# Patient Record
Sex: MALE | Race: WHITE | NOT HISPANIC OR LATINO | Employment: FULL TIME | URBAN - METROPOLITAN AREA
[De-identification: names, ages, dates, MRNs, and addresses within clinical notes are randomized per-mention and may not be internally consistent; named-entity substitution may affect disease eponyms.]

---

## 2017-04-14 ENCOUNTER — ALLIED HEALTH/NURSE VISIT (OUTPATIENT)
Dept: NURSING | Facility: CLINIC | Age: 27
End: 2017-04-14
Payer: COMMERCIAL

## 2017-04-14 DIAGNOSIS — H61.21 IMPACTED CERUMEN OF RIGHT EAR: Primary | ICD-10-CM

## 2017-04-14 PROCEDURE — 99207 ZZC NO CHARGE NURSE ONLY: CPT

## 2017-04-14 NOTE — MR AVS SNAPSHOT
After Visit Summary   4/14/2017    Chema Cazares    MRN: [id number]           Patient Information     Date Of Birth          1990        Visit Information        Provider Department      4/14/2017 4:30 PM  NURSE [facility]        Today's Diagnoses     Impacted cerumen of right ear    -  1       Follow-ups after your visit        Who to contact     If you have questions or need follow up information about today's clinic visit or your schedule please contact [facility] directly at [phone].  Normal or non-critical lab and imaging results will be communicated to you by Straker Translationshart, letter or phone within 4 business days after the clinic has received the results. If you do not hear from us within 7 days, please contact the clinic through [employer] or phone. If you have a critical or abnormal lab result, we will notify you by phone as soon as possible.  Submit refill requests through PolySuite or call your pharmacy and they will forward the refill request to us. Please allow 3 business days for your refill to be completed.          Additional Information About Your Visit        MyChart Information     PolySuite gives you secure access to your electronic health record. If you see a primary care provider, you can also send messages to your care team and make appointments. If you have questions, please call your primary care clinic.  If you do not have a primary care provider, please call [phone] and they will assist you.        Care EveryWhere ID     This is your Care EveryWhere ID. This could be used by other organizations to access your [facility] medical records  [id number]         Blood Pressure from Last 3 Encounters:   11/15/12 120/69    Weight from Last 3 Encounters:   11/15/12 153 lb (69.4 kg)              Today, you had the following     No orders found for display       Primary Care Provider    Physician No Ref-Primary       No address on file        Thank you!      Thank you for choosing [facility]  for your care. Our goal is always to provide you with excellent care. Hearing back from our patients is one way we can continue to improve our services. Please take a few minutes to complete the written survey that you may receive in the mail after your visit with us. Thank you!             Your Updated Medication List - Protect others around you: Learn how to safely use, store and throw away your medicines at www.disposemymeds.org.          This list is accurate as of: 4/14/17  4:51 PM.  Always use your most recent med list.                   Brand Name Dispense Instructions for use    naproxen 500 MG tablet    NAPROSYN    60 tablet    Take 1 tablet by mouth 2 times daily as needed.       NO ACTIVE MEDICATIONS

## 2017-04-14 NOTE — NURSING NOTE
Chema Cazarse is here for an ear wash.    Tried to remove wax doing an ear lavage and the wax was too hard. Patient started complaining there was a lot of pressure, so I stopped. He said he would try some things at home and if he is unable to he will return.

## 2017-08-14 ENCOUNTER — OFFICE VISIT (OUTPATIENT)
Dept: FAMILY MEDICINE | Facility: CLINIC | Age: 27
End: 2017-08-14
Payer: COMMERCIAL

## 2017-08-14 VITALS
BODY MASS INDEX: 20.77 KG/M2 | OXYGEN SATURATION: 98 % | WEIGHT: 145.1 LBS | TEMPERATURE: 98.4 F | DIASTOLIC BLOOD PRESSURE: 87 MMHG | RESPIRATION RATE: 16 BRPM | HEIGHT: 70 IN | SYSTOLIC BLOOD PRESSURE: 135 MMHG | HEART RATE: 94 BPM

## 2017-08-14 DIAGNOSIS — A63.0 CONDYLOMATA ACUMINATA: ICD-10-CM

## 2017-08-14 DIAGNOSIS — Z11.3 SCREEN FOR STD (SEXUALLY TRANSMITTED DISEASE): Primary | ICD-10-CM

## 2017-08-14 PROCEDURE — 87389 HIV-1 AG W/HIV-1&-2 AB AG IA: CPT | Performed by: FAMILY MEDICINE

## 2017-08-14 PROCEDURE — 99212 OFFICE O/P EST SF 10 MIN: CPT | Mod: 25 | Performed by: FAMILY MEDICINE

## 2017-08-14 PROCEDURE — 86780 TREPONEMA PALLIDUM: CPT | Performed by: FAMILY MEDICINE

## 2017-08-14 PROCEDURE — 54056 CRYOSURGERY PENIS LESION(S): CPT | Performed by: FAMILY MEDICINE

## 2017-08-14 PROCEDURE — 87591 N.GONORRHOEAE DNA AMP PROB: CPT | Performed by: FAMILY MEDICINE

## 2017-08-14 PROCEDURE — 87491 CHLMYD TRACH DNA AMP PROBE: CPT | Performed by: FAMILY MEDICINE

## 2017-08-14 PROCEDURE — 36415 COLL VENOUS BLD VENIPUNCTURE: CPT | Performed by: FAMILY MEDICINE

## 2017-08-14 NOTE — MR AVS SNAPSHOT
"              After Visit Summary   8/14/2017    Chema Cazares    MRN: [id number]           Patient Information     Date Of Birth          1990        Visit Information        Provider Department      8/14/2017 5:30 PM Humble Rhoades MD [facility]        Today's Diagnoses     Screen for STD (sexually transmitted disease)    -  1    Condylomata acuminata           Follow-ups after your visit        Who to contact     If you have questions or need follow up information about today's clinic visit or your schedule please contact [facility] directly at [phone].  Normal or non-critical lab and imaging results will be communicated to you by Taodynehart, letter or phone within 4 business days after the clinic has received the results. If you do not hear from us within 7 days, please contact the clinic through Taodynehart or phone. If you have a critical or abnormal lab result, we will notify you by phone as soon as possible.  Submit refill requests through RealtimeBoard or call your pharmacy and they will forward the refill request to us. Please allow 3 business days for your refill to be completed.          Additional Information About Your Visit        MyChart Information     RealtimeBoard gives you secure access to your electronic health record. If you see a primary care provider, you can also send messages to your care team and make appointments. If you have questions, please call your primary care clinic.  If you do not have a primary care provider, please call [phone] and they will assist you.        Care EveryWhere ID     This is your Care EveryWhere ID. This could be used by other organizations to access your [place] medical records  [id number]        Your Vitals Were     Pulse Temperature Respirations Height Pulse Oximetry BMI (Body Mass Index)    94 98.4  F (36.9  C) (Oral) 16 5' 10\" (1.778 m) 98% 20.82 kg/m2       Blood Pressure from Last 3 Encounters:   08/14/17 " 135/87   11/15/12 120/69    Weight from Last 3 Encounters:   08/14/17 145 lb 1.6 oz (65.8 kg)   11/15/12 153 lb (69.4 kg)              We Performed the Following     Anti Treponema     CHLAMYDIA TRACHOMATIS PCR     DESTRUCT BENIGN LESION, UP TO 14     HIV Antigen Antibody Combo     NEISSERIA GONORRHOEA PCR        Primary Care Provider    Physician No Ref-Primary       No address on file        Equal Access to Services     KIKO SALCIDO : Hadii aad ku hadasho Soomaali, waaxda luqadaha, qaybta kaalmada adeegyada, waxay idiin hayaan aderadha english laBrandiflorence . So [facility] [phone].    ATENCIÓN: Si habla breanna, tiene a bone disposición servicios gratuitos de asistencia lingüística. Llame al [phone].    We comply with applicable federal civil rights laws and [place] laws. We do not discriminate on the basis of race, color, national origin, age, disability sex, sexual orientation or gender identity.            Thank you!     Thank you for choosing [facility]  for your care. Our goal is always to provide you with excellent care. Hearing back from our patients is one way we can continue to improve our services. Please take a few minutes to complete the written survey that you may receive in the mail after your visit with us. Thank you!             Your Updated Medication List - Protect others around you: Learn how to safely use, store and throw away your medicines at www.disposemymeds.org.          This list is accurate as of: 8/14/17 11:59 PM.  Always use your most recent med list.                   Brand Name Dispense Instructions for use Diagnosis    naproxen 500 MG tablet    NAPROSYN    60 tablet    Take 1 tablet by mouth 2 times daily as needed.    Pain in shoulder, Elbow dislocation       NO ACTIVE MEDICATIONS

## 2017-08-15 PROBLEM — A63.0 CONDYLOMATA ACUMINATA: Status: ACTIVE | Noted: 2017-08-15

## 2017-08-15 LAB — HIV 1+2 AB+HIV1 P24 AG SERPL QL IA: NONREACTIVE

## 2017-08-16 LAB
C TRACH DNA SPEC QL NAA+PROBE: NEGATIVE
N GONORRHOEA DNA SPEC QL NAA+PROBE: NEGATIVE
SPECIMEN SOURCE: NORMAL
SPECIMEN SOURCE: NORMAL
T PALLIDUM IGG+IGM SER QL: NEGATIVE

## 2017-10-13 ENCOUNTER — TELEPHONE (OUTPATIENT)
Dept: FAMILY MEDICINE | Facility: CLINIC | Age: 27
End: 2017-10-13

## 2017-10-13 NOTE — TELEPHONE ENCOUNTER
"FYI:    Patient is reporting chest soreness, over the left side of his chest.  Some left shoulder pain too, \"messed up\" shoulder and arm in the past per his report.  Some numbness in elbow, this is more frequent lately. Has also dislocated that elbow many times.  No nausea, dizziness, numbness in his fingers. No sweating episodes. No palpitations.  Pain does not radiate to him arm, jaw or back. No recent illnesses. No fever.  Pain is not worse with pressure on the chest.  Feeling fine otherwise. 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  This will be going on for a while. Has an appointment next Tuesday with Leonid to check this.  He is advised to be seen sooner if the symptoms are increased and pain does not resolve.    Dolores King, JUANY  Triage Nurse  "

## 2017-10-17 ENCOUNTER — OFFICE VISIT (OUTPATIENT)
Dept: FAMILY MEDICINE | Facility: CLINIC | Age: 27
End: 2017-10-17
Payer: COMMERCIAL

## 2017-10-17 VITALS
DIASTOLIC BLOOD PRESSURE: 60 MMHG | BODY MASS INDEX: 22.09 KG/M2 | SYSTOLIC BLOOD PRESSURE: 122 MMHG | HEIGHT: 70 IN | OXYGEN SATURATION: 100 % | HEART RATE: 85 BPM | WEIGHT: 154.3 LBS | TEMPERATURE: 98 F

## 2017-10-17 DIAGNOSIS — R07.89 ATYPICAL CHEST PAIN: ICD-10-CM

## 2017-10-17 DIAGNOSIS — M25.522 LEFT ELBOW PAIN: Primary | ICD-10-CM

## 2017-10-17 DIAGNOSIS — G56.22 ULNAR NEUROPATHY OF LEFT UPPER EXTREMITY: ICD-10-CM

## 2017-10-17 PROCEDURE — 99213 OFFICE O/P EST LOW 20 MIN: CPT | Performed by: PHYSICIAN ASSISTANT

## 2017-10-17 NOTE — PATIENT INSTRUCTIONS
Good to meet you Max,    Let's keep an eye on your chest symptoms. If they increase or change, symptoms begin to last longer than a few seconds or you're noting difficulty breathing or low energy I want to see you again. We'll think about doing that monitoring study or a stress study. For now we can just watch.

## 2017-10-17 NOTE — PROGRESS NOTES
"  SUBJECTIVE:   Chema Cazares is a 27 year old male who presents to clinic today for the following health issues:    Musculoskeletal problem/pain      Duration: several years    Description  Location: left elbow/sholder    Intensity:  mild    Accompanying signs and symptoms: numbness and tingling in hand  and elbow ocassionally    History  Previous similar problem: YES  Previous evaluation:  x-ray    Precipitating or alleviating factors:  Trauma or overuse: YES- dislocated elbow several times  Aggravating factors include: none    Therapies tried and outcome: Ibuprofen    -Patient notes that over several years he has had intermittent n/t in his left arm into the hand  -He notes he has dislocated the arm multiple times (self dx) and had to pop it in himself  -He'll note that the n/t can happen when seated watching tv or out at a bar when flexed and rested  -There is no loss of  strengths  -he guesses it happens at least a bit every day, lasts only a few seconds      Chest Pain      Onset: 1-2 years ago    Description (location/character/radiation/duration): left side, squeezing pain    Intensity:  Severe for few seconds    Accompanying signs and symptoms:        Shortness of breath: no        Sweating: no        Nausea/vomitting: no        Palpitations: YES- occ       Other (fevers/chills/cough/heartburn/lightheadedness): no     History (similar episodes/previous evaluation): None    Precipitating or alleviating factors:       Worse with exertion: no        Worse with breathing: no       Related to eating: no        Better with burping: no     Therapies tried and outcome: None    -patient notes a hx of left sided chest pain (\"like a jelly bean trying to go through his heart\"; or \"like the heart is getting squeezed\"  -heart almost seems like it's doing somersaults  -lasts around 2 seconds, never longer  -never associated with shoulder or jaw pain  -nearly always happens when he is moving  -happens randomly, would " "guess it's happened around 15 times in the past 2 years  -at times it has hurt worse with deep breathing  -he does smoke socially, and for around 10 years, one ppd   -now only socially  -he is able to do his very physical job without any problems   -denies gross shortness of breath or chest pain        Problem list and histories reviewed & adjusted, as indicated.  Additional history: as documented    Patient Active Problem List   Diagnosis     CARDIOVASCULAR SCREENING; LDL GOAL LESS THAN 160     Tobacco abuse     Condylomata acuminata     Past Surgical History:   Procedure Laterality Date     MOUTH SURGERY      wisdom teeth       Social History   Substance Use Topics     Smoking status: Current Some Day Smoker     Packs/day: 0.30     Years: 5.00     Smokeless tobacco: Never Used     Alcohol use 2.0 - 2.5 oz/week     4 - 5 Standard drinks or equivalent per week     Family History   Problem Relation Age of Onset     DIABETES Mother      DIABETES Maternal Aunt      Hypertension Father              Reviewed and updated as needed this visit by clinical staffAllergies       Reviewed and updated as needed this visit by Provider         ROS:  Constitutional, HEENT, cardiovascular, pulmonary, gi and gu systems are negative, except as otherwise noted.      OBJECTIVE:   /60 (BP Location: Right arm, Cuff Size: Adult Large)  Pulse 85  Temp 98  F (36.7  C) (Oral)  Ht 5' 10\" (1.778 m)  Wt 154 lb 4.8 oz (70 kg)  SpO2 100%  BMI 22.14 kg/m2  Body mass index is 22.14 kg/(m^2).  GENERAL: healthy, alert and no distress  RESP: lungs clear to auscultation - no rales, rhonchi or wheezes  CV: regular rates and rhythm, normal S1 S2, no S3 or S4 and no murmur, click or rub, no ectopy  MS: there is tenderness over the ulnar nerve space in the left elbow, palpation can elicit the n/t symptoms into the forearm, there is no evidence for joint abnormality today,  strength is full; pain with resisted radial deviation and full " extension    Diagnostic Test Results:  none     ASSESSMENT/PLAN:   1. Left elbow pain  2. Ulnar neuropathy of left upper extremity  This may be a simple epicondylitis however given his hx of multiple elbow dislocations and persistent pain I'd like him to see ortho before staring simple physical therapy and simple supportive cares.   - ORTHO  REFERRAL    3. Atypical chest pain  I am doubtful this is of great significance today. He does have smoking hx but symptoms do not appear grossly cardiac other than his occasional palpitations. He is able to do a very demanding job without symptoms including chest pain or shortness of breath. Some of the symptoms are repeatable with palpation or movement. For now we'll monitor and he'll keep a journal when symptoms return to try and identify triggers. If these , change, worsen or increase in anyway we'll plan for work up. Patient understands and is agreeable to the care plan.      Moises Prescott PA-C  [facility]

## 2017-10-17 NOTE — MR AVS SNAPSHOT
After Visit Summary   10/17/2017    Chema Cazares    MRN: [id number]           Patient Information     Date Of Birth          1990        Visit Information        Provider Department      10/17/2017 5:20 PM Moises Prescott PA-C [facility]        Today's Diagnoses     Left elbow pain    -  1    Ulnar neuropathy of left upper extremity        Atypical chest pain          Care Instructions    Good to meet you Max,    Let's keep an eye on your chest symptoms. If they increase or change, symptoms begin to last longer than a few seconds or you're noting difficulty breathing or low energy I want to see you again. We'll think about doing that monitoring study or a stress study. For now we can just watch.          Follow-ups after your visit        Additional Services     ORTHO  REFERRAL       Coverage of these services is subject to the terms and limitations of your health insurance plan. Please call member services at your health plan with any benefit or coverage questions.       [facility] is referring you to the Orthopedic  Services at [facility].       The  representative will assist you in the coordination of your orthopedic and musculoskeletal care as prescribed by your physician.    The  representative will call you within 24 hours or   You may contact the  representative at:   [phone] for [place] [facility]  [phone] for [facility], and [facility]  [phone] for [facility]    Type of Referral : Non Surgical       Timeframe requested: Routine       If X-rays, CT or MRI's   have been performed, please contact the facility where they were done, to arrange for  prior to your scheduled appointment. Please bring this referral request to your appointment and present it to your specialist.                  Who to contact     If you have questions or need follow up  "information about today's clinic visit or your schedule please contact [facility] directly at [phone].  Normal or non-critical lab and imaging results will be communicated to you by MyChart, letter or phone within 4 business days after the clinic has received the results. If you do not hear from us within 7 days, please contact the clinic through Subject Companyhart or phone. If you have a critical or abnormal lab result, we will notify you by phone as soon as possible.  Submit refill requests through [employer] or call your pharmacy and they will forward the refill request to us. Please allow 3 business days for your refill to be completed.          Additional Information About Your Visit        [employer] Information     [employer] gives you secure access to your electronic health record. If you see a primary care provider, you can also send messages to your care team and make appointments. If you have questions, please call your primary care clinic.  If you do not have a primary care provider, please call [phone] and they will assist you.        Care EveryWhere ID     This is your Care EveryWhere ID. This could be used by other organizations to access your [place] medical records  [id number]        Your Vitals Were     Pulse Temperature Height Pulse Oximetry BMI (Body Mass Index)       85 98  F (36.7  C) (Oral) 5' 10\" (1.778 m) 100% 22.14 kg/m2        Blood Pressure from Last 3 Encounters:   10/17/17 122/60   08/14/17 135/87   11/15/12 120/69    Weight from Last 3 Encounters:   10/17/17 154 lb 4.8 oz (70 kg)   08/14/17 145 lb 1.6 oz (65.8 kg)   11/15/12 153 lb (69.4 kg)              We Performed the Following     ORTHO  REFERRAL        Primary Care Provider    Physician No Ref-Primary       NO REF-PRIMARY PHYSICIAN        Equal Access to Services     NORA SALCIDO : Erick Arzola, rommel kothari, qasharonta kaalleigh jordan, yasmine matos. So wac " [phone].    ATENCIÓN: Si tim carlos, tiene a bone disposición servicios gratuitos de asistencia lingüística. Hung al [phone].    We comply with applicable federal civil rights laws and [place] laws. We do not discriminate on the basis of race, color, national origin, age, disability, sex, sexual orientation, or gender identity.            Thank you!     Thank you for choosing [facility]  for your care. Our goal is always to provide you with excellent care. Hearing back from our patients is one way we can continue to improve our services. Please take a few minutes to complete the written survey that you may receive in the mail after your visit with us. Thank you!             Your Updated Medication List - Protect others around you: Learn how to safely use, store and throw away your medicines at www.disposemymeds.org.          This list is accurate as of: 10/17/17  6:02 PM.  Always use your most recent med list.                   Brand Name Dispense Instructions for use Diagnosis    naproxen 500 MG tablet    NAPROSYN    60 tablet    Take 1 tablet by mouth 2 times daily as needed.    Pain in shoulder, Elbow dislocation       NO ACTIVE MEDICATIONS

## 2017-10-17 NOTE — NURSING NOTE
"Chief Complaint   Patient presents with     Chest Pain     Elbow Pain       Initial /60 (BP Location: Right arm, Cuff Size: Adult Large)  Pulse 85  Temp 98  F (36.7  C) (Oral)  Ht 5' 10\" (1.778 m)  Wt 154 lb 4.8 oz (70 kg)  SpO2 100%  BMI 22.14 kg/m2 Estimated body mass index is 22.14 kg/(m^2) as calculated from the following:    Height as of this encounter: 5' 10\" (1.778 m).    Weight as of this encounter: 154 lb 4.8 oz (70 kg).  Medication Reconciliation: complete   Wellington Pan CMA      "

## 2017-10-25 ENCOUNTER — OFFICE VISIT (OUTPATIENT)
Dept: ORTHOPEDICS | Facility: CLINIC | Age: 27
End: 2017-10-25
Payer: COMMERCIAL

## 2017-10-25 VITALS
HEIGHT: 70 IN | WEIGHT: 154 LBS | DIASTOLIC BLOOD PRESSURE: 65 MMHG | BODY MASS INDEX: 22.05 KG/M2 | SYSTOLIC BLOOD PRESSURE: 124 MMHG

## 2017-10-25 DIAGNOSIS — M25.522 LEFT ELBOW PAIN: Primary | ICD-10-CM

## 2017-10-25 PROCEDURE — 99244 OFF/OP CNSLTJ NEW/EST MOD 40: CPT | Performed by: PHYSICAL MEDICINE & REHABILITATION

## 2017-10-25 NOTE — NURSING NOTE
"Chief Complaint   Patient presents with     Musculoskeletal Problem     L elbow pain, numbness in forearm/hand       Initial /65  Ht 5' 10\" (1.778 m)  Wt 154 lb (69.9 kg)  BMI 22.1 kg/m2 Estimated body mass index is 22.1 kg/(m^2) as calculated from the following:    Height as of this encounter: 5' 10\" (1.778 m).    Weight as of this encounter: 154 lb (69.9 kg).  Medication Reconciliation: complete     Florentino Licona, ATC    "

## 2017-10-25 NOTE — PATIENT INSTRUCTIONS
We addressed the following today:    1. Left elbow pain    Activity modification as discussed  Topical Treatments: Ice or heat  Over the counter medication: Acetaminophen (Tylenol) 1000 mg every 6 hours with food (Maximum of 3000 mg/day)  Ibuprofen (Advil) maximum of 800 mg four times a day with food  MRI at [facility] - call [phone] to schedule  Follow up 1-2 business days after completion of further diagnostic imaging for discussion of results/further medical care (sooner if needed; call direct clinic number [[phone]] at any time with questions or concerns)

## 2017-10-25 NOTE — MR AVS SNAPSHOT
After Visit Summary   10/25/2017    Chema Cazares    MRN: [id number]           Patient Information     Date Of Birth          1990        Visit Information        Provider Department      10/25/2017 4:00 PM Jevon Barber,  [facility]        Today's Diagnoses     Left elbow pain    -  1      Care Instructions    We addressed the following today:    1. Left elbow pain    Activity modification as discussed  Topical Treatments: Ice or heat  Over the counter medication: Acetaminophen (Tylenol) 1000 mg every 6 hours with food (Maximum of 3000 mg/day)  Ibuprofen (Advil) maximum of 800 mg four times a day with food  MRI at [facility] - call [phone] to schedule  Follow up 1-2 business days after completion of further diagnostic imaging for discussion of results/further medical care (sooner if needed; call direct clinic number [[phone]] at any time with questions or concerns)              Follow-ups after your visit        Who to contact     If you have questions or need follow up information about today's clinic visit or your schedule please contact [facility] directly at [phone].  Normal or non-critical lab and imaging results will be communicated to you by [employer], letter or phone within 4 business days after the clinic has received the results. If you do not hear from us within 7 days, please contact the clinic through [employer] or phone. If you have a critical or abnormal lab result, we will notify you by phone as soon as possible.  Submit refill requests through [employer] or call your pharmacy and they will forward the refill request to us. Please allow 3 business days for your refill to be completed.          Additional Information About Your Visit        MyChart Information     [employer] gives you secure access to your electronic health record. If you see a primary care provider, you can also send messages to your care team and make appointments. If  "you have questions, please call your primary care clinic.  If you do not have a primary care provider, please call [phone] and they will assist you.        Care EveryWhere ID     This is your Care EveryWhere ID. This could be used by other organizations to access your [place] medical records  [id number]        Your Vitals Were     Height BMI (Body Mass Index)                5' 10\" (1.778 m) 22.1 kg/m2           Blood Pressure from Last 3 Encounters:   10/25/17 124/65   10/17/17 122/60   08/14/17 135/87    Weight from Last 3 Encounters:   10/25/17 154 lb (69.9 kg)   10/17/17 154 lb 4.8 oz (70 kg)   08/14/17 145 lb 1.6 oz (65.8 kg)                 Today's Medication Changes          These changes are accurate as of: 10/25/17  5:08 PM.  If you have any questions, ask your nurse or doctor.               Stop taking these medicines if you haven't already. Please contact your care team if you have questions.     naproxen 500 MG tablet   Commonly known as:  NAPROSYN   Stopped by:  Jevon Barber, DO                    Primary Care Provider    Physician No Ref-Primary       NO REF-PRIMARY PHYSICIAN        Equal Access to Services     NORA SALCIDO AH: Hadii kyle Arzola, waaxda luwilliams, qaybta kaalmada adenevinda, yasmine matos. So [facility] [phone].    ATENCIÓN: Si habla español, tiene a bone disposición servicios gratuitos de asistencia lingüística. Llame al [phone].    We comply with applicable federal civil rights laws and [place] laws. We do not discriminate on the basis of race, color, national origin, age, disability, sex, sexual orientation, or gender identity.            Thank you!     Thank you for choosing [facility]  for your care. Our goal is always to provide you with excellent care. Hearing back from our patients is one way we can continue to improve our services. Please take a few minutes to complete the written survey that you may " receive in the mail after your visit with us. Thank you!             Your Updated Medication List - Protect others around you: Learn how to safely use, store and throw away your medicines at www.disposemymeds.org.          This list is accurate as of: 10/25/17  5:08 PM.  Always use your most recent med list.                   Brand Name Dispense Instructions for use Diagnosis    NO ACTIVE MEDICATIONS

## 2017-10-25 NOTE — PROGRESS NOTES
"[facility]   Clinic Visit s Oct 25, 2017    Subjective:  Chema Cazares is a 27 year old right-hand dominant male, who is seen in consultation at the request of Moises Prescott PA-C for evaluation of left elbow pain with numbness/tingling.    Symptoms began years ago, worse over the past month.  Reports insidious onset without acute precipitating event.  Reports left elbow pain that is located posterior with radiation present.  Pain is 6/10 in maximal severity and 0/10 currently.  Symptoms are generally worse with nothing in particular and better with propping his left arm up on a pillow.  No treatment tried to date. Notes intermittent numbness and tingling of the left arm from the left elbow down to the left hand. Notes left upper extremity weakness.  Notes popping and grinding of the left elbow.  History of a left humerus fracture and multiple left elbow dislocations per the patient with self reductions in the past.  Denies any previous left elbow surgeries.    Patient's past medical, surgical, social, and family histories are reviewed today.  There are no significant contributory medical issues    Review of Systems:  Constitutional: NEGATIVE for fever, chills, or change in weight  Skin: NEGATIVE for worrisome rashes, moles, or lesions  Neuro: POSITIVE for weakness of the left upper extremity  MSK: see HPI  Additional 10 point ROS is negative other than symptoms noted above and in HPI    Objective:  /65  Ht 5' 10\" (1.778 m)  Wt 154 lb (69.9 kg)  BMI 22.1 kg/m2  General: healthy, alert, and in no distress  Skin: no suspicious lesions or rashes  Psych: mentation appears normal and affect normal/bright  HEENT: no scleral icterus  CV: no pedal edema  Resp: normal respiratory effort without conversational dyspnea   Neuro: motor strength as noted below  Lymph: no palpable lymphadenopathy    MSK:    LEFT ELBOW  Inspection:    No swelling, bruising, discoloration, or obvious deformity or " asymmetry  Palpation:    No tenderness over the lateral epicondyle, medial epicondyle, olecranon bursa, or distal bicep tendon  Active Range of Motion:     Extension normal / flexion limited by stiffness / pronation normal / supination normal  Strength:    Flexion/extension within normal limits  Special Tests:    Positive: None    Negative: Pain with resisted wrist extension, pain with resisted middle finger extension, pain with resisted wrist flexion, pain with resisted supination, passive valgus stress, pain with resisted pronation, passive varus stress, and cubital tunnel (ulnar Tinel's)    Imaging:  Previous report was reviewed today and results were discussed with the patient  Left elbow x-rays were ordered, independent visualization of images was performed, and results were discussed with the patient  Impression:  1. Small ossicles at the lateral radiocapitellar joint line worrisome for articular bodies, probably unchanged from 11/15/2012.  2. Radiocapitellar alignment is within normal limits.   3. No abnormal fat pad sign.    ASSESSMENT:  1. Chronic left elbow pain - differential diagnoses includes intra-articular loose body, ligamentous derangement, posttraumatic elbow osteoarthrosis, etc.    PLAN:  1. MR arthrogram of the left elbow for further evaluation of current medical state at [facility].  2. Activity modification as discussed, including limitation of activities that cause pain/discomfort.  3. Acetaminophen/Ibuprofen/ice as needed for improved pain control.  4. Follow-up 1-2 business days after completion of further diagnostic imaging for discussion of results/further medical care.  Instructed to contact our office should the condition evolve or worsen.    Patient's conditions were thoroughly discussed during today's visit with greater than 50% of the visit spent counseling the patient with total time spent face-to-face with the patient being 25 minutes.    Jevon Barber DO, [facility]    Disclaimer: This note consists of symbols derived from keyboarding, dictation and/or voice recognition software. As a result, there may be errors in the script that have gone undetected. Please consider this when interpreting information found in this chart.

## 2017-10-25 NOTE — LETTER
"    10/25/2017         RE: Chema Cazares  [address] [place] [address]        Dear Colleague,    Thank you for referring your patient, Chema Cazares, to the [facility]. Please see a copy of my visit note below.    [facility]   Clinic Visit s Oct 25, 2017    Subjective:  Chema Cazares is a 27 year old right-hand dominant male, who is seen in consultation at the request of Moises Prescott PA-C for evaluation of left elbow pain with numbness/tingling.    Symptoms began years ago, worse over the past month.  Reports insidious onset without acute precipitating event.  Reports left elbow pain that is located posterior with radiation present.  Pain is 6/10 in maximal severity and 0/10 currently.  Symptoms are generally worse with nothing in particular and better with propping his left arm up on a pillow.  No treatment tried to date. Notes intermittent numbness and tingling of the left arm from the left elbow down to the left hand. Notes left upper extremity weakness.  Notes popping and grinding of the left elbow.  History of a left humerus fracture and multiple left elbow dislocations per the patient with self reductions in the past.  Denies any previous left elbow surgeries.    Patient's past medical, surgical, social, and family histories are reviewed today.  There are no significant contributory medical issues    Review of Systems:  Constitutional: NEGATIVE for fever, chills, or change in weight  Skin: NEGATIVE for worrisome rashes, moles, or lesions  Neuro: POSITIVE for weakness of the left upper extremity  MSK: see HPI  Additional 10 point ROS is negative other than symptoms noted above and in HPI    Objective:  /65  Ht 5' 10\" (1.778 m)  Wt 154 lb (69.9 kg)  BMI 22.1 kg/m2  General: healthy, alert, and in no distress  Skin: no suspicious lesions or rashes  Psych: mentation appears normal and affect normal/bright  HEENT: no scleral icterus  CV: no pedal " edema  Resp: normal respiratory effort without conversational dyspnea   Neuro: motor strength as noted below  Lymph: no palpable lymphadenopathy    MSK:    LEFT ELBOW  Inspection:    No swelling, bruising, discoloration, or obvious deformity or asymmetry  Palpation:    No tenderness over the lateral epicondyle, medial epicondyle, olecranon bursa, or distal bicep tendon  Active Range of Motion:     Extension normal / flexion limited by stiffness / pronation normal / supination normal  Strength:    Flexion/extension within normal limits  Special Tests:    Positive: None    Negative: Pain with resisted wrist extension, pain with resisted middle finger extension, pain with resisted wrist flexion, pain with resisted supination, passive valgus stress, pain with resisted pronation, passive varus stress, and cubital tunnel (ulnar Tinel's)    Imaging:  Previous report was reviewed today and results were discussed with the patient  Left elbow x-rays were ordered, independent visualization of images was performed, and results were discussed with the patient  Impression:  1. Small ossicles at the lateral radiocapitellar joint line worrisome for articular bodies, probably unchanged from 11/15/2012.  2. Radiocapitellar alignment is within normal limits.   3. No abnormal fat pad sign.    ASSESSMENT:  1. Chronic left elbow pain - differential diagnoses includes intra-articular loose body, ligamentous derangement, posttraumatic elbow osteoarthrosis, etc.    PLAN:  1. MR arthrogram of the left elbow for further evaluation of current medical state at [facility].  2. Activity modification as discussed, including limitation of activities that cause pain/discomfort.  3. Acetaminophen/Ibuprofen/ice as needed for improved pain control.  4. Follow-up 1-2 business days after completion of further diagnostic imaging for discussion of results/further medical care.  Instructed to contact our office should the condition evolve or worsen.    Patient's  conditions were thoroughly discussed during today's visit with greater than 50% of the visit spent counseling the patient with total time spent face-to-face with the patient being 25 minutes.    Jevon Barber DO, [facility]  [facility]    Disclaimer: This note consists of symbols derived from keyboarding, dictation and/or voice recognition software. As a result, there may be errors in the script that have gone undetected. Please consider this when interpreting information found in this chart.        Again, thank you for allowing me to participate in the care of your patient.        Sincerely,        Jevon Barber DO

## 2017-10-25 NOTE — Clinical Note
Dear Chema Laguna saw me at [facility] on Oct 25, 2017.  Please refer to the visit note at your convenience and feel free to contact me should you have any questions.  Sincerely,  Jevon Barber DO, [facility]

## 2018-06-29 ENCOUNTER — ALLIED HEALTH/NURSE VISIT (OUTPATIENT)
Dept: NURSING | Facility: CLINIC | Age: 28
End: 2018-06-29
Payer: COMMERCIAL

## 2018-06-29 DIAGNOSIS — Z23 NEED FOR VACCINATION: Primary | ICD-10-CM

## 2018-06-29 PROCEDURE — 90715 TDAP VACCINE 7 YRS/> IM: CPT

## 2018-06-29 PROCEDURE — 90471 IMMUNIZATION ADMIN: CPT

## 2018-07-31 ENCOUNTER — RADIANT APPOINTMENT (OUTPATIENT)
Dept: GENERAL RADIOLOGY | Facility: CLINIC | Age: 28
End: 2018-07-31
Attending: FAMILY MEDICINE
Payer: COMMERCIAL

## 2018-07-31 ENCOUNTER — OFFICE VISIT (OUTPATIENT)
Dept: URGENT CARE | Facility: URGENT CARE | Age: 28
End: 2018-07-31
Payer: COMMERCIAL

## 2018-07-31 VITALS — WEIGHT: 151.2 LBS | TEMPERATURE: 98.7 F | OXYGEN SATURATION: 100 % | BODY MASS INDEX: 21.69 KG/M2 | HEART RATE: 88 BPM

## 2018-07-31 DIAGNOSIS — M89.8X1 PAIN OF LEFT CLAVICLE: ICD-10-CM

## 2018-07-31 DIAGNOSIS — S42.022A CLOSED DISPLACED FRACTURE OF SHAFT OF LEFT CLAVICLE, INITIAL ENCOUNTER: Primary | ICD-10-CM

## 2018-07-31 PROCEDURE — 99214 OFFICE O/P EST MOD 30 MIN: CPT | Performed by: FAMILY MEDICINE

## 2018-07-31 PROCEDURE — 73000 X-RAY EXAM OF COLLAR BONE: CPT | Mod: LT

## 2018-07-31 RX ORDER — HYDROCODONE BITARTRATE AND ACETAMINOPHEN 5; 325 MG/1; MG/1
1 TABLET ORAL EVERY 6 HOURS PRN
Qty: 15 TABLET | Refills: 0 | Status: SHIPPED | OUTPATIENT
Start: 2018-07-31 | End: 2018-09-13

## 2018-07-31 NOTE — MR AVS SNAPSHOT
After Visit Summary   7/31/2018    Chema Cazares    MRN: [id number]           Patient Information     Date Of Birth          1990        Visit Information        Provider Department      7/31/2018 7:30 PM Bri Sheets MD [facility]        Today's Diagnoses     Closed displaced fracture of shaft of left clavicle, initial encounter    -  1    Pain of left clavicle           Follow-ups after your visit        Additional Services     ORTHO  REFERRAL       [facility] is referring you to the Orthopedic  Services at [facility].       The  Representative will assist you in the coordination of your Orthopedic and Musculoskeletal Care as prescribed by your physician.    The  Representative will call you within 1 business day to help schedule your appointment, or you may contact the  Representative at:    All areas ~ [phone]     Type of Referral : Surgical / Specialist       Timeframe requested: 1 - 2 days    Coverage of these services is subject to the terms and limitations of your health insurance plan.  Please call member services at your health plan with any benefit or coverage questions.      If X-rays, CT or MRI's have been performed, please contact the facility where they were done to arrange for , prior to your scheduled appointment.  Please bring this referral request to your appointment and present it to your specialist.                  Who to contact     If you have questions or need follow up information about today's clinic visit or your schedule please contact [facility] directly at [phone].  Normal or non-critical lab and imaging results will be communicated to you by MyChart, letter or phone within 4 business days after the clinic has received the results. If you do not hear from us within 7 days, please contact the clinic through MyChart or phone. If  you have a critical or abnormal lab result, we will notify you by phone as soon as possible.  Submit refill requests through [employer] or call your pharmacy and they will forward the refill request to us. Please allow 3 business days for your refill to be completed.          Additional Information About Your Visit        [employer] Information     [employer] gives you secure access to your electronic health record. If you see a primary care provider, you can also send messages to your care team and make appointments. If you have questions, please call your primary care clinic.  If you do not have a primary care provider, please call [phone] and they will assist you.        Care EveryWhere ID     This is your Care EveryWhere ID. This could be used by other organizations to access your [place] medical records  [id number]        Your Vitals Were     Pulse Temperature Pulse Oximetry BMI (Body Mass Index)          88 98.7  F (37.1  C) (Tympanic) 100% 21.69 kg/m2         Blood Pressure from Last 3 Encounters:   10/25/17 124/65   10/17/17 122/60   08/14/17 135/87    Weight from Last 3 Encounters:   07/31/18 151 lb 3.2 oz (68.6 kg)   10/25/17 154 lb (69.9 kg)   10/17/17 154 lb 4.8 oz (70 kg)              We Performed the Following     ORTHO  REFERRAL          Today's Medication Changes          These changes are accurate as of 7/31/18  9:29 PM.  If you have any questions, ask your nurse or doctor.               Start taking these medicines.        Dose/Directions    HYDROcodone-acetaminophen 5-325 MG per tablet   Commonly known as:  NORCO   Used for:  Pain of left clavicle   Started by:  Bri Sheets MD        Dose:  1 tablet   Take 1 tablet by mouth every 6 hours as needed for pain   Quantity:  15 tablet   Refills:  0            Where to get your medicines      Some of these will need a paper prescription and others can be bought over the counter.  Ask your nurse if you have questions.     Bring a paper  prescription for each of these medications     HYDROcodone-acetaminophen 5-325 MG per tablet               Information about OPIOIDS     PRESCRIPTION OPIOIDS: WHAT YOU NEED TO KNOW   We gave you an opioid (narcotic) pain medicine. It is important to manage your pain, but opioids are not always the best choice. You should first try all the other options your care team gave you. Take this medicine for as short a time (and as few doses) as possible.     These medicines have risks:    DO NOT drive when on new or higher doses of pain medicine. These medicines can affect your alertness and reaction times, and you could be arrested for driving under the influence (DUI). If you need to use opioids long-term, talk to your care team about driving.    DO NOT operate heave machinery    DO NOT do any other dangerous activities while taking these medicines.     DO NOT drink any alcohol while taking these medicines.      If the opioid prescribed includes acetaminophen, DO NOT take with any other medicines that contain acetaminophen. Read all labels carefully. Look for the word  acetaminophen  or  Tylenol.  Ask your pharmacist if you have questions or are unsure.    You can get addicted to pain medicines, especially if you have a history of addiction (chemical, alcohol or substance dependence). Talk to your care team about ways to reduce this risk.    Store your pills in a secure place, locked if possible. We will not replace any lost or stolen medicine. If you don t finish your medicine, please throw away (dispose) as directed by your pharmacist. The [place] Pollution Control Agency has more information about safe disposal: https://www.pca.[facility].[place].us/living-green/managing-unwanted-medications.     All opioids tend to cause constipation. Drink plenty of water and eat foods that have a lot of fiber, such as fruits, vegetables, prune juice, apple juice and high-fiber cereal. Take a laxative (Miralax, milk of magnesia, Colace,  Senna) if you don t move your bowels at least every other day.          Primary Care Provider Fax #    Physician No Ref-Primary [phone]       No address on file        Equal Access to Services     NORA SALCIDO : Hadii aad ku hadbridgettjames Arzola, aveda lunellchaparrita, francisco katai jordan, yasmine castro jeremyradha english laBrandiflorence matos. So [facility] [phone].    ATENCIÓN: Si habla español, tiene a bone disposición servicios gratuitos de asistencia lingüística. Llame al [phone].    We comply with applicable federal civil rights laws and [place] laws. We do not discriminate on the basis of race, color, national origin, age, disability, sex, sexual orientation, or gender identity.            Thank you!     Thank you for choosing [facility]  for your care. Our goal is always to provide you with excellent care. Hearing back from our patients is one way we can continue to improve our services. Please take a few minutes to complete the written survey that you may receive in the mail after your visit with us. Thank you!             Your Updated Medication List - Protect others around you: Learn how to safely use, store and throw away your medicines at www.disposemymeds.org.          This list is accurate as of 7/31/18  9:29 PM.  Always use your most recent med list.                   Brand Name Dispense Instructions for use Diagnosis    HYDROcodone-acetaminophen 5-325 MG per tablet    NORCO    15 tablet    Take 1 tablet by mouth every 6 hours as needed for pain    Pain of left clavicle       NO ACTIVE MEDICATIONS

## 2018-07-31 NOTE — LETTER
[facility]  [address]  [address]  [place] [address]  [phone]      July 31, 2018    RE:  Chema Cazares                                                                                                                                                       [address] [address] [address]      To whom it may concern:    Chema Cazares is under my professional care for a fractured left collarbone.  Please excuse his absence from work due to this injury and the need for follow-up care with orthopedics in the next few days.    Sincerely,        Bri Sheets    [facility]

## 2018-08-01 ENCOUNTER — OFFICE VISIT (OUTPATIENT)
Dept: ORTHOPEDICS | Facility: CLINIC | Age: 28
End: 2018-08-01
Payer: COMMERCIAL

## 2018-08-01 VITALS
DIASTOLIC BLOOD PRESSURE: 70 MMHG | HEIGHT: 70 IN | SYSTOLIC BLOOD PRESSURE: 114 MMHG | WEIGHT: 153 LBS | BODY MASS INDEX: 21.9 KG/M2

## 2018-08-01 DIAGNOSIS — S42.022A CLOSED DISPLACED FRACTURE OF SHAFT OF LEFT CLAVICLE, INITIAL ENCOUNTER: Primary | ICD-10-CM

## 2018-08-01 PROCEDURE — 23500 CLTX CLAVICULAR FX W/O MNPJ: CPT | Performed by: FAMILY MEDICINE

## 2018-08-01 PROCEDURE — 99243 OFF/OP CNSLTJ NEW/EST LOW 30: CPT | Mod: 57 | Performed by: FAMILY MEDICINE
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
## 2018-08-01 NOTE — PATIENT INSTRUCTIONS
1. Closed displaced fracture of shaft of left clavicle, initial encounter      Reviewed x-rays -displaced midshaft clavicle fracture  Sling for 1-2 weeks for comfort.  Out for range of motion of the elbow  Will always have a bulge at your clavicle  Okay to use Tylenol and ibuprofen/Aleve for pain  Can use Norco at night if desired.  No alcohol or driving while taking  Okay to work however no lifting, pushing or pulling with the left arm.  No work letter needed.    Follow up 2 weeks.

## 2018-08-01 NOTE — MR AVS SNAPSHOT
After Visit Summary   8/1/2018    Chema Cazares    MRN: [id number]           Patient Information     Date Of Birth          1990        Visit Information        Provider Department      8/1/2018 9:20 AM Mike Hidalgo DO [facility] SP[facility]        Today's Diagnoses     Closed displaced fracture of shaft of left clavicle, initial encounter    -  1      Care Instructions    1. Closed displaced fracture of shaft of left clavicle, initial encounter      Reviewed x-rays -displaced midshaft clavicle fracture  Sling for 1-2 weeks for comfort.  Out for range of motion of the elbow  Will always have a bulge at your clavicle  Okay to use Tylenol and ibuprofen/Aleve for pain  Can use Norco at night if desired.  No alcohol or driving while taking  Okay to work however no lifting, pushing or pulling with the left arm.  No work letter needed.    Follow up 2 weeks.          Follow-ups after your visit        Your next 10 appointments already scheduled     Aug 14, 2018  5:40 PM CDT   Return Visit with Mike Hidalgo DO   [facility] ([facility] [place])    [address] [address]   [phone]              Who to contact     If you have questions or need follow up information about today's clinic visit or your schedule please contact [facility] directly at [phone].  Normal or non-critical lab and imaging results will be communicated to you by MyChart, letter or phone within 4 business days after the clinic has received the results. If you do not hear from us within 7 days, please contact the clinic through MyChart or phone. If you have a critical or abnormal lab result, we will notify you by phone as soon as possible.  Submit refill requests through [employer] or call your pharmacy and they will forward the refill request to us. Please allow 3 business days for your refill to be completed.           "Additional Information About Your Visit        MyChart Information     [employer] gives you secure access to your electronic health record. If you see a primary care provider, you can also send messages to your care team and make appointments. If you have questions, please call your primary care clinic.  If you do not have a primary care provider, please call [phone] and they will assist you.        Care EveryWhere ID     This is your Care EveryWhere ID. This could be used by other organizations to access your [facility] medical records  [id number]        Your Vitals Were     Height BMI (Body Mass Index)                5' 10\" (1.778 m) 21.95 kg/m2           Blood Pressure from Last 3 Encounters:   08/01/18 114/70   10/25/17 124/65   10/17/17 122/60    Weight from Last 3 Encounters:   08/01/18 153 lb (69.4 kg)   07/31/18 151 lb 3.2 oz (68.6 kg)   10/25/17 154 lb (69.9 kg)              Today, you had the following     No orders found for display       Primary Care Provider Fax #    Physician No Ref-Primary [phone]       No address on file        Equal Access to Services     KIKO SALCIDO : Hadii kyle Arzola, wakieranda taye, qaybta kaalmalouise jordan, yasmine miller . So [facility] [phone].    ATENCIÓN: Si habla español, tiene a bone disposición servicios gratChalet Tech[employer] de asistencia lingüística. [facility] [phone].    We comply with applicable federal civil rights laws and [place] laws. We do not discriminate on the basis of race, color, national origin, age, disability, sex, sexual orientation, or gender identity.            Thank you!     Thank you for choosing [facility] SPORTS [facility]  for your care. Our goal is always to provide you with excellent care. Hearing back from our patients is one way we can continue to improve our services. Please take a few minutes to complete the written survey that you may receive in the mail after your visit with us. Thank you!      "        Your Updated Medication List - Protect others around you: Learn how to safely use, store and throw away your medicines at www.disposemymeds.org.          This list is accurate as of 8/1/18 11:08 AM.  Always use your most recent med list.                   Brand Name Dispense Instructions for use Diagnosis    HYDROcodone-acetaminophen 5-325 MG per tablet    NORCO    15 tablet    Take 1 tablet by mouth every 6 hours as needed for pain    Pain of left clavicle       NO ACTIVE MEDICATIONS

## 2018-08-14 ENCOUNTER — OFFICE VISIT (OUTPATIENT)
Dept: ORTHOPEDICS | Facility: CLINIC | Age: 28
End: 2018-08-14
Payer: COMMERCIAL

## 2018-08-14 ENCOUNTER — RADIANT APPOINTMENT (OUTPATIENT)
Dept: GENERAL RADIOLOGY | Facility: CLINIC | Age: 28
End: 2018-08-14
Attending: FAMILY MEDICINE
Payer: COMMERCIAL

## 2018-08-14 VITALS
SYSTOLIC BLOOD PRESSURE: 116 MMHG | DIASTOLIC BLOOD PRESSURE: 68 MMHG | HEIGHT: 70 IN | BODY MASS INDEX: 21.9 KG/M2 | WEIGHT: 153 LBS

## 2018-08-14 DIAGNOSIS — S42.022D CLOSED DISPLACED FRACTURE OF SHAFT OF LEFT CLAVICLE WITH ROUTINE HEALING, SUBSEQUENT ENCOUNTER: ICD-10-CM

## 2018-08-14 DIAGNOSIS — S42.022D CLOSED DISPLACED FRACTURE OF SHAFT OF LEFT CLAVICLE WITH ROUTINE HEALING, SUBSEQUENT ENCOUNTER: Primary | ICD-10-CM

## 2018-08-14 PROCEDURE — 99207 ZZC FRACTURE CARE IN GLOBAL PERIOD: CPT | Performed by: FAMILY MEDICINE

## 2018-08-14 PROCEDURE — 73000 X-RAY EXAM OF COLLAR BONE: CPT | Mod: LT

## 2018-08-14 NOTE — MR AVS SNAPSHOT
After Visit Summary   8/14/2018    Chema Cazares    MRN: [id number]           Patient Information     Date Of Birth          1990        Visit Information        Provider Department      8/14/2018 5:40 PM Mike Hidalgo,  [facility] SP[facility]        Today's Diagnoses     Closed displaced fracture of shaft of left clavicle with routine healing, subsequent encounter    -  1      Care Instructions    1. Closed displaced fracture of shaft of left clavicle with routine healing, subsequent encounter      Pain is well controlled and range of motion is improving  Reviewed xray - slight upward motion of one fracture segment  Will always have the bump/deformity  Range of motion to your tolerance / if it's pain free  No weighted activity with the left shoulder/arm    Follow up in 4 weeks with repeat xray.          Follow-ups after your visit        Who to contact     If you have questions or need follow up information about today's clinic visit or your schedule please contact [facility] SPORTS MEDICINE directly at [phone].  Normal or non-critical lab and imaging results will be communicated to you by [employer], letter or phone within 4 business days after the clinic has received the results. If you do not hear from us within 7 days, please contact the clinic through [employer] or phone. If you have a critical or abnormal lab result, we will notify you by phone as soon as possible.  Submit refill requests through [employer] or call your pharmacy and they will forward the refill request to us. Please allow 3 business days for your refill to be completed.          Additional Information About Your Visit        [employer] Information     [employer] gives you secure access to your electronic health record. If you see a primary care provider, you can also send messages to your care team and make appointments. If you have questions, please call your primary care clinic.  If you do not have a primary  "care provider, please call [phone] and they will assist you.        Care EveryWhere ID     This is your Care EveryWhere ID. This could be used by other organizations to access your [facility] medical records  [id number]        Your Vitals Were     Height BMI (Body Mass Index)                5' 10\" (1.778 m) 21.95 kg/m2           Blood Pressure from Last 3 Encounters:   08/14/18 116/68   08/01/18 114/70   10/25/17 124/65    Weight from Last 3 Encounters:   08/14/18 153 lb (69.4 kg)   08/01/18 153 lb (69.4 kg)   07/31/18 151 lb 3.2 oz (68.6 kg)               Primary Care Provider Fax #    Physician No Ref-Primary [phone]       No address on file        Equal Access to Services     NORA SALCIDO : Erick Arzola, waaxda luqadaha, qaybta kaalmada nikki, yasmine miller . So [facility] [phone].    ATENCIÓN: Si habla español, tiene a bone disposición servicios gratuitos de asistencia lingüística. Hung al [phone].    We comply with applicable federal civil rights laws and [place] laws. We do not discriminate on the basis of race, color, national origin, age, disability, sex, sexual orientation, or gender identity.            Thank you!     Thank you for choosing [facility]  for your care. Our goal is always to provide you with excellent care. Hearing back from our patients is one way we can continue to improve our services. Please take a few minutes to complete the written survey that you may receive in the mail after your visit with us. Thank you!             Your Updated Medication List - Protect others around you: Learn how to safely use, store and throw away your medicines at www.disposemymeds.org.          This list is accurate as of 8/14/18  6:22 PM.  Always use your most recent med list.                   Brand Name Dispense Instructions for use Diagnosis    HYDROcodone-acetaminophen 5-325 MG per tablet    NORCO    15 tablet    Take 1 " tablet by mouth every 6 hours as needed for pain    Pain of left clavicle       NO ACTIVE MEDICATIONS
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
## 2018-08-14 NOTE — PATIENT INSTRUCTIONS
1. Closed displaced fracture of shaft of left clavicle with routine healing, subsequent encounter      Pain is well controlled and range of motion is improving  Reviewed xray - slight upward motion of one fracture segment  Will always have the bump/deformity  Range of motion to your tolerance / if it's pain free  No weighted activity with the left shoulder/arm    Follow up in 4 weeks with repeat xray.

## 2018-09-13 ENCOUNTER — RADIANT APPOINTMENT (OUTPATIENT)
Dept: GENERAL RADIOLOGY | Facility: CLINIC | Age: 28
End: 2018-09-13
Attending: FAMILY MEDICINE
Payer: COMMERCIAL

## 2018-09-13 ENCOUNTER — OFFICE VISIT (OUTPATIENT)
Dept: ORTHOPEDICS | Facility: CLINIC | Age: 28
End: 2018-09-13
Payer: COMMERCIAL

## 2018-09-13 VITALS
SYSTOLIC BLOOD PRESSURE: 120 MMHG | HEIGHT: 70 IN | BODY MASS INDEX: 21.9 KG/M2 | DIASTOLIC BLOOD PRESSURE: 74 MMHG | WEIGHT: 153 LBS

## 2018-09-13 DIAGNOSIS — S42.022D CLOSED DISPLACED FRACTURE OF SHAFT OF LEFT CLAVICLE WITH ROUTINE HEALING, SUBSEQUENT ENCOUNTER: ICD-10-CM

## 2018-09-13 DIAGNOSIS — S42.022D CLOSED DISPLACED FRACTURE OF SHAFT OF LEFT CLAVICLE WITH ROUTINE HEALING, SUBSEQUENT ENCOUNTER: Primary | ICD-10-CM

## 2018-09-13 PROCEDURE — 99207 ZZC FRACTURE CARE IN GLOBAL PERIOD: CPT | Performed by: FAMILY MEDICINE

## 2018-09-13 PROCEDURE — 73000 X-RAY EXAM OF COLLAR BONE: CPT | Mod: LT
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
## 2018-09-13 NOTE — MR AVS SNAPSHOT
After Visit Summary   9/13/2018    Chema Cazares    MRN: [id number]           Patient Information     Date Of Birth          1990        Visit Information        Provider Department      9/13/2018 11:00 AM Mike Hidalgo,  [facility]        Today's Diagnoses     Closed displaced fracture of shaft of left clavicle with routine healing, subsequent encounter    -  1      Care Instructions    1. Closed displaced fracture of shaft of left clavicle with routine healing, subsequent encounter      Doing well  Slowly progress activity  No contact activities until 12 weeks from date of injury    Follow up as needed.                  Follow-ups after your visit        Who to contact     If you have questions or need follow up information about today's clinic visit or your schedule please contact [facility] [place] directly at [phone].  Normal or non-critical lab and imaging results will be communicated to you by Kidboxhart, letter or phone within 4 business days after the clinic has received the results. If you do not hear from us within 7 days, please contact the clinic through Kidboxhart or phone. If you have a critical or abnormal lab result, we will notify you by phone as soon as possible.  Submit refill requests through Telerivet or call your pharmacy and they will forward the refill request to us. Please allow 3 business days for your refill to be completed.          Additional Information About Your Visit        Kidboxhart Information     Telerivet gives you secure access to your electronic health record. If you see a primary care provider, you can also send messages to your care team and make appointments. If you have questions, please call your primary care clinic.  If you do not have a primary care provider, please call [phone] and they will assist you.        Care EveryWhere ID     This is your Care EveryWhere ID. This could be used by other organizations to  "access your [place] medical records  [id number]        Your Vitals Were     Height BMI (Body Mass Index)                5' 10\" (1.778 m) 21.95 kg/m2           Blood Pressure from Last 3 Encounters:   09/13/18 120/74   08/14/18 116/68   08/01/18 114/70    Weight from Last 3 Encounters:   09/13/18 153 lb (69.4 kg)   08/14/18 153 lb (69.4 kg)   08/01/18 153 lb (69.4 kg)                 Today's Medication Changes          These changes are accurate as of 9/13/18 11:59 PM.  If you have any questions, ask your nurse or doctor.               Stop taking these medicines if you haven't already. Please contact your care team if you have questions.     HYDROcodone-acetaminophen 5-325 MG per tablet   Commonly known as:  NORCO   Stopped by:  Mike Hidalgo,                     Primary Care Provider Fax #    Physician No Ref-Primary [phone]       No address on file        Equal Access to Services     KIKO SALCIDO : Erick steveo Somonse, waaxda luqadaha, qaybta kaalmada adeegyada, yasmine miller . So [facility] [phone].    ATENCIÓN: Si habla español, tiene a bone disposición servicios gratuitos de asistencia lingüística. Llame al [phone].    We comply with applicable federal civil rights laws and [place] laws. We do not discriminate on the basis of race, color, national origin, age, disability, sex, sexual orientation, or gender identity.            Thank you!     Thank you for choosing [facility]  for your care. Our goal is always to provide you with excellent care. Hearing back from our patients is one way we can continue to improve our services. Please take a few minutes to complete the written survey that you may receive in the mail after your visit with us. Thank you!             Your Updated Medication List - Protect others around you: Learn how to safely use, store and throw away your medicines at www.disposemymeds.org.          This list is accurate " as of 9/13/18 11:59 PM.  Always use your most recent med list.                   Brand Name Dispense Instructions for use Diagnosis    NO ACTIVE MEDICATIONS

## 2019-11-06 ENCOUNTER — HEALTH MAINTENANCE LETTER (OUTPATIENT)
Age: 29
End: 2019-11-06

## 2020-11-29 ENCOUNTER — HEALTH MAINTENANCE LETTER (OUTPATIENT)
Age: 30
End: 2020-11-29

## 2021-03-25 NOTE — PROGRESS NOTES
"ASSESSMENT & PLAN    ICD-10-CM    1. Closed displaced fracture of shaft of left clavicle with routine healing, subsequent encounter S42.022D XR Clavicle LT     Pain is well controlled and range of motion is improving  Reviewed xray - slight upward motion of one fracture segment  Will always have the bump/deformity  Range of motion to your tolerance / if it's pain free  No weighted activity with the left shoulder/arm  Discussed option of referring for surgical consultation and desires to be conservative recognizing risks - delayed healing, persistent deformity    Follow up in 4 weeks with repeat xray.  ---    SUBJECTIVE:  Chema Cazares is a 28 year old male is seen today for follow up of Closed displaced fracture of shaft of left clavicle with routine healing, subsequent encounter.  Injury occurred on 7/31/2018 (2 weeks ago).  Last visit was on 8/1/18.  Has been using a sling for comfort. He has taken it off periodically to do range of motion of the elbow.  He denies currently using any oral pain medication.  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    Patient's past medical, surgical, social, and family histories are reviewed today in the medical record.    OBJECTIVE:  /68  Ht 5' 10\" (1.778 m)  Wt 153 lb (69.4 kg)  BMI 21.95 kg/m2  General: Well-appearing and in no acute distress  Skin: No evidence of skin breakdown, compromise, or ulceration  NVS: Regional pulses normal.  Capillary refill less than 2 seconds.  Normal sensation noted.  No limitations noted on strength testing. Full range of motion of the exposed digits  MSK:  LEFT SHOULDER  Inspection:    Clavicular bulge/deformity  Palpation:  Mildly tender over the mid clavicle.    Active Range of Motion:   Limited in abduction and forward flexion secondary to fracture.    Strength:  Grossly intact    IMAGING:  XR CLAVICLE LT 2 VIEWS 8/14/2018 6:05 PM      HISTORY: ; Closed " displaced fracture of shaft of left clavicle with  routine healing, subsequent encounter         IMPRESSION: Clavicular middle third fracture, alignment unchanged from  7/31/2018. No calcification is yet visible.     CHUCKY FLORES MD    Patient's conditions were thoroughly discussed during today's visit with greater than 50% of the visit spent counseling the patient with total time spent face-to-face with the patient being 15 minutes.    Mike Hidalgo,  [facility]   [place] macroglobulinemia

## 2021-09-19 ENCOUNTER — HEALTH MAINTENANCE LETTER (OUTPATIENT)
Age: 31
End: 2021-09-19

## 2022-01-09 ENCOUNTER — HEALTH MAINTENANCE LETTER (OUTPATIENT)
Age: 32
End: 2022-01-09

## 2022-11-21 ENCOUNTER — HEALTH MAINTENANCE LETTER (OUTPATIENT)
Age: 32
End: 2022-11-21

## 2023-03-24 ENCOUNTER — VIRTUAL VISIT (OUTPATIENT)
Dept: INTERNAL MEDICINE | Facility: CLINIC | Age: 33
End: 2023-03-24

## 2023-03-24 DIAGNOSIS — J01.00 ACUTE NON-RECURRENT MAXILLARY SINUSITIS: Primary | ICD-10-CM

## 2023-03-24 PROCEDURE — 99203 OFFICE O/P NEW LOW 30 MIN: CPT | Mod: 95 | Performed by: INTERNAL MEDICINE

## 2023-03-24 RX ORDER — DOXYCYCLINE 100 MG/1
100 CAPSULE ORAL 2 TIMES DAILY
Qty: 10 CAPSULE | Refills: 0 | Status: SHIPPED | OUTPATIENT
Start: 2023-03-24 | End: 2023-03-29

## 2023-03-24 NOTE — PROGRESS NOTES
Chema is a 32 year old who is being evaluated via a billable telephone visit.      What phone number would you like to be contacted at? [phone]  How would you like to obtain your AVS? Mail a copy    Distant Location (provider location):  On-site    Assessment & Plan   Problem List Items Addressed This Visit        Respiratory    Acute non-recurrent maxillary sinusitis - Primary     Patient presents with over a month of right and right nostril drainage and 1 week of right maxillary pain.  He still is having eye discharge and had possible dust or other fragments fall while working on his car a month ago.  Discussed with the patient that we can treat the maxillary pain as sinusitis, but it is very important that he also see an eye doctor to evaluate for conjunctivitis, corneal tear/abrasion, etc. Patient voiced understanding.   - Doxycycline 100 mg BID x5 days  - Needs to see eye doctor  - If no improvement, next visit needs to be in person         Relevant Medications    doxycycline hyclate (VIBRAMYCIN) 100 MG capsule      Prescription drug management  I spent a total of 15 minutes on the day of the visit.   Time spent doing chart review, history and exam, documentation and further activities per the note     Danni Valiente MD  [facility]   Chema is a 32 year old, presenting for the following health issues:  Sinus Problem    HPI     Acute Illness  Acute illness concerns: Possible Sinus infection   Onset/Duration: Month ago eyes watering, but started to hurt week ago  Symptoms:  Fever: No  Chills/Sweats: YES  Headache (location?): YES  Sinus Pressure: YES  Conjunctivitis:  No  Ear Pain: no  Rhinorrhea: No  Congestion: YES  Sore Throat: No  Cough: no  Wheeze: No  Decreased Appetite: No  Nausea: No  Vomiting: No  Diarrhea: No  Dysuria/Freq.: No  Dysuria or Hematuria: No  Fatigue/Achiness: YES  Sick/Strep Exposure: YES  Therapies tried and outcome: Nyquil Excedrin  "    Patient reports that a month ago he was working underneath his car and some dust might have fallen in his eye.  Since then his right eye and right nostril have been \"leaking\".  He has been waking up with discharge/eye goop on the right.  No redness or itchiness.  Then, a week ago he started to have pain in his right tear duct and in the bones below his eye.  He says that now feels like he got punched in the face.  Nose is running more as well.  Has had intermittent \"hot flashes\", has not checked temperature.  No cough or sore throat.  No one at home has similar symptoms.  He does have a penicillin allergy, says he got hives as a child.    Review of Systems   Constitutional, HEENT, cardiovascular, pulmonary, gi and gu systems are negative, except as otherwise noted.      Objective         Vitals:  No vitals were obtained today due to virtual visit.    Physical Exam   healthy, alert and no distress  PSYCH: Alert and oriented times 3; coherent speech, normal   rate and volume, able to articulate logical thoughts, able   to abstract reason, no tangential thoughts, no hallucinations   or delusions  His affect is normal  RESP: No cough, no audible wheezing, able to talk in full sentences  Remainder of exam unable to be completed due to telephone visits        Phone call duration: 9 minutes    "

## 2023-03-24 NOTE — ASSESSMENT & PLAN NOTE
Patient presents with over a month of right and right nostril drainage and 1 week of right maxillary pain.  He still is having eye discharge and had possible dust or other fragments fall while working on his car a month ago.  Discussed with the patient that we can treat the maxillary pain as sinusitis, but it is very important that he also see an eye doctor to evaluate for conjunctivitis, corneal tear/abrasion, etc. Patient voiced understanding.   - Doxycycline 100 mg BID x5 days  - Needs to see eye doctor  - If no improvement, next visit needs to be in person

## 2023-06-02 ENCOUNTER — HEALTH MAINTENANCE LETTER (OUTPATIENT)
Age: 33
End: 2023-06-02

## 2024-06-22 ENCOUNTER — HEALTH MAINTENANCE LETTER (OUTPATIENT)
Age: 34
End: 2024-06-22

## 2025-07-12 ENCOUNTER — HEALTH MAINTENANCE LETTER (OUTPATIENT)
Age: 35
End: 2025-07-12